# Patient Record
Sex: MALE | Race: ASIAN | NOT HISPANIC OR LATINO | Employment: FULL TIME | URBAN - METROPOLITAN AREA
[De-identification: names, ages, dates, MRNs, and addresses within clinical notes are randomized per-mention and may not be internally consistent; named-entity substitution may affect disease eponyms.]

---

## 2020-08-19 ENCOUNTER — TELEPHONE (OUTPATIENT)
Dept: FAMILY MEDICINE | Facility: CLINIC | Age: 45
End: 2020-08-19

## 2020-08-19 DIAGNOSIS — Z20.822 EXPOSURE TO 2019 NOVEL CORONAVIRUS: ICD-10-CM

## 2020-08-19 DIAGNOSIS — Z20.822 EXPOSURE TO 2019 NOVEL CORONAVIRUS: Primary | ICD-10-CM

## 2020-08-19 LAB
COVID-19 VIRUS PCR TO U OF MN - SOURCE: NORMAL
SARS-COV-2 RNA SPEC QL NAA+PROBE: NOT DETECTED

## 2020-08-19 NOTE — TELEPHONE ENCOUNTER
[facility] phone call message- general phone call:    Reason for call: Calling to schedule covid for patient, she states he is exposed to her as she test positive for covid. Patient was last seen 2016. Please call and advise.     Return call needed: Yes    OK to leave a message on voice mail?     Primary language: English      needed? No    Call taken on August 19, 2020 at 9:19 AM by Alvin Abreu

## 2020-08-19 NOTE — PATIENT INSTRUCTIONS
Instructions for Patients  Patient Education   After Your COVID-19 (Coronavirus) Test  You have been tested for COVID-19 (coronavirus).   If you'll have surgery in the next few days, we'll let you know ahead of time if you have the virus. Please call your surgeon's office with any questions.  For all other patients: Results are usually available within 2 to 10 days. Our testing sites do not have access to your test results.     If your test result is positive, you'll get a phone call letting you know. (A positive test means that you have the virus.)    If your test result is negative, you'll get a letter in the mail. (A negative test suggests you do not have the virus.) If you use [employer], you'll get a message from [employer] when your result is ready.  After 7 to 10 days, if you have not gotten your results:     Call [phone] and ask to speak with our COVID-19 results team.    If you're being treated at an infusion center: Call your infusion center directly.  What are the symptoms of COVID-19?  Symptoms may include any of the following: Fever, cough, trouble breathing, headache, body aches, sore throat, runny or stuffy nose, fatigue (feeling very tired), diarrhea (loose poop), and nausea or vomiting (feeling sick to the stomach or throwing up).  You may already have symptoms of COVID-19, or they may show up later.  What should I do if I have symptoms?  If you're having surgery: Call your surgeon's office.  For all other patients: Stay home and away from others (self-isolate) until ...    You've had no fever--and no medicine that reduces fever--for 1 full day (24 hours), AND    Other symptoms have gotten better. For example, your cough or breathing has improved, AND    At least 10 days have passed since your symptoms first started.  During this time    Stay in your own room, even for meals. Use your own bathroom if you can.    Stay away from others in your home. No hugging, kissing or shaking hands. No  "visitors.    Don't go to work, school or anywhere else.    Clean \"high touch\" surfaces often (doorknobs, counters, handles). Use household cleaning spray or wipes. You'll find a full list of  on the EPA website: www.epa.gov/pesticide-registration/list-n-disinfectants-use-against-sars-cov-2.    Cover your mouth and nose with a mask, tissue or washcloth to avoid spreading germs.    Wash your hands and face often. Use soap and water.    Caregivers in these groups are at risk for severe illness due to COVID-19:  ? People 65 years and older  ? People who live in a nursing home or long-term care facility  ? People with chronic disease (lung, heart, cancer, diabetes, kidney, liver, immunologic)  ? People who have a weakened immune system, including those who:    Are in cancer treatment    Take medicine that weakens the immune system, such as corticosteroids    Had a bone marrow or organ transplant    Have an immune deficiency    Have poorly controlled HIV or AIDS    Are obese (body mass index of 40 or higher)    Smoke regularly    Caregivers should wear gloves while washing dishes, handling laundry and cleaning bedrooms and bathrooms.    Use caution when washing and drying laundry: Don't shake dirty laundry and use the warmest water setting that you can.    For more tips on managing your health at home, go to www.cdc.gov/coronavirus/2019-ncov/downloads/10Things.pdf.  How can I take care of myself at home?  1. Get lots of rest. Drink extra fluids (unless a doctor has told you not to).  2. Take Tylenol (acetaminophen) for fever or pain. If you have liver or kidney problems, ask your family doctor if it's okay to take Tylenol.     Adults can take either:  ? 650 mg (two 325 mg pills) every 4 to 6 hours, or   ? 1,000 mg (two 500 mg pills) every 8 hours as needed.  ? Note: Don't take more than 3,000 mg in one day. Acetaminophen is found in many medicines (both prescribed and over-the-counter medicines). Read all labels " to be sure you don't take too much.   For children, check the Tylenol bottle for the right dose. The dose is based on the child's age or weight.  3. If you have other health problems (like cancer, heart failure, an organ transplant or severe kidney disease): Call your specialty clinic if you don't feel better in the next 2 days.  4. Know when to call 911. Emergency warning signs include:  ? Trouble breathing or shortness of breath  ? Chest pain or pressure that doesn't go away  ? Feeling confused like you haven't felt before, or not being able to wake up  ? Bluish-colored lips or face  5. If your doctor prescribed a blood thinner medicine: Follow their instructions.  Where can I get more information?    [facility] - About COVID-19:   www.[employer]/covid19    CDC - If You're Sick: cdc.gov/coronavirus/2019-ncov/about/steps-when-sick.html    CDC - Ending Home Isolation: www.cdc.gov/coronavirus/2019-ncov/hcp/disposition-in-home-patients.html    CDC - Caring for Someone: www.cdc.gov/coronavirus/2019-ncov/if-you-are-sick/care-for-someone.html    [facility] - Interim Guidance for Hospital Discharge to Home: www.health.[facility].[place]/diseases/coronavirus/hcp/hospdischarge.pdf    [facility] clinical trials (COVID-19 research studies): clinicalaffairs.[facility]-clinical-trials    Below are the COVID-19 hotlines at the [facility] of Health ([facility]). Interpreters are available.  ? For health questions: Call 145-915-3992 or 1-535.759.9063 (7 a.m. to 7 p.m.)  ? For questions about schools and childcare: Call 800-715-5986 or 1-387.219.3224 (7 a.m. to 7 p.m.)    For informational purposes only. Not to replace the advice of your health care provider. Clinically reviewed by Infection Prevention and the [facility] COVID-19 Clinical Team. Copyright   2020 [employer]. All rights reserved. [employer] 313817 - Rev 06/07/20.             Thank you for taking steps to prevent the spread of  this virus.  o Limit your contact with others.  o Wear a simple mask to cover your cough.  o Wash your hands well and often.  o If you need medical care, go to [facility] or contact your health care provider.     For more about COVID-19 and caring for yourself at home, visit the CDC website at https://www.cdc.gov/coronavirus/2019-ncov/about/steps-when-sick.html.     To learn about care at [facility], please go to [email or web address]/[email or web address]-19.     [facility] clinical trials (COVID-19 research studies): clinicalaffairs.[facility]-clinical-trials.    Below are the COVID-19 hotlines at the [facility] of Health ([facility]). Interpreters are available.     For health questions: Call 420-266-6120 or 1-204.619.5360 (7 a.m. to 7 p.m.)    For questions about schools and childcare: Call 110-120-4930 or 1-833.390.7195 (7 a.m. to 7 p.m.)

## 2020-08-21 ENCOUNTER — TELEPHONE (OUTPATIENT)
Dept: FAMILY MEDICINE | Facility: CLINIC | Age: 45
End: 2020-08-21

## 2020-08-21 NOTE — TELEPHONE ENCOUNTER
[facility] phone call message- patient requesting results:    Test: Lab    Date of test: 08/19/2020    Additional Comments: Patient wants to know what his covid test results are. Please call ad advise.     OK to leave a message on voice mail? Yes    Primary language: English      needed? No    Call taken on August 21, 2020 at 10:54 AM by Kary Cullen

## 2020-08-21 NOTE — TELEPHONE ENCOUNTER
Jose informed of negative COVID19 result, will print and place copy of result up front for . Patient to . Jose MOTT

## 2020-08-21 NOTE — TELEPHONE ENCOUNTER
Patient would like his covid results print, he needs it for his work and he will . Call when it is ready for . Please call and advise.

## 2021-06-09 ENCOUNTER — OFFICE VISIT (OUTPATIENT)
Dept: FAMILY MEDICINE | Facility: CLINIC | Age: 46
End: 2021-06-09
Payer: COMMERCIAL

## 2021-06-09 VITALS
HEIGHT: 62 IN | SYSTOLIC BLOOD PRESSURE: 133 MMHG | TEMPERATURE: 97.8 F | BODY MASS INDEX: 31.83 KG/M2 | WEIGHT: 173 LBS | DIASTOLIC BLOOD PRESSURE: 73 MMHG | HEART RATE: 71 BPM | OXYGEN SATURATION: 95 % | RESPIRATION RATE: 16 BRPM

## 2021-06-09 DIAGNOSIS — E66.811 CLASS 1 OBESITY DUE TO EXCESS CALORIES WITHOUT SERIOUS COMORBIDITY WITH BODY MASS INDEX (BMI) OF 31.0 TO 31.9 IN ADULT: ICD-10-CM

## 2021-06-09 DIAGNOSIS — Z87.892 HISTORY OF ANAPHYLAXIS: Primary | ICD-10-CM

## 2021-06-09 DIAGNOSIS — E66.09 CLASS 1 OBESITY DUE TO EXCESS CALORIES WITHOUT SERIOUS COMORBIDITY WITH BODY MASS INDEX (BMI) OF 31.0 TO 31.9 IN ADULT: ICD-10-CM

## 2021-06-09 LAB
CHOLEST SERPL-MCNC: 187 MG/DL
FASTING?: NO
HDLC SERPL-MCNC: 52 MG/DL
LDLC SERPL CALC-MCNC: 116 MG/DL
TRIGL SERPL-MCNC: 93 MG/DL

## 2021-06-09 PROCEDURE — 90471 IMMUNIZATION ADMIN: CPT | Performed by: STUDENT IN AN ORGANIZED HEALTH CARE EDUCATION/TRAINING PROGRAM

## 2021-06-09 PROCEDURE — 90715 TDAP VACCINE 7 YRS/> IM: CPT | Performed by: STUDENT IN AN ORGANIZED HEALTH CARE EDUCATION/TRAINING PROGRAM

## 2021-06-09 PROCEDURE — 99204 OFFICE O/P NEW MOD 45 MIN: CPT | Mod: 25 | Performed by: STUDENT IN AN ORGANIZED HEALTH CARE EDUCATION/TRAINING PROGRAM

## 2021-06-09 PROCEDURE — 36415 COLL VENOUS BLD VENIPUNCTURE: CPT | Performed by: STUDENT IN AN ORGANIZED HEALTH CARE EDUCATION/TRAINING PROGRAM

## 2021-06-09 RX ORDER — EPINEPHRINE 0.3 MG/.3ML
0.3 INJECTION SUBCUTANEOUS PRN
Qty: 2 EACH | Refills: 3 | Status: SHIPPED | OUTPATIENT
Start: 2021-06-09

## 2021-06-09 ASSESSMENT — MIFFLIN-ST. JEOR: SCORE: 1540.97

## 2021-06-09 NOTE — LETTER
Isabela 10, 2021      Jose SIFUENTES Brady  [address] [place] [address]        Dear ,    We are writing to inform you of your test results.    Your cholesterol looks great.  Keep up the good work.    Resulted Orders   Lipid [place] ([facility]) - Results > 1 hr   Result Value Ref Range    Cholesterol 187 <=199 mg/dL    Triglycerides 93 <=149 mg/dL    HDL Cholesterol 52 >=40 mg/dL    LDL Cholesterol Calculated 116 <=129 mg/dL    Fasting? No     Narrative    Test performed by:  [facility]  [address] [place] [address]       If you have any questions or concerns, please call the clinic at the number listed above.       Sincerely,      Stuart Lanier MD

## 2021-06-09 NOTE — PROGRESS NOTES
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  He has had reactions in the past that sound anaphylactic in nature.  Has never used EpiPen in the past, but his old one is .  Plan:   -EPINEPHrine (ANY BX GENERIC EQUIV) 0.3 MG/0.3ML injection 2-pack    (E66.09,  Z68.31) Class 1 obesity due to excess calories without serious comorbidity with body mass index (BMI) of 31.0 to 31.9 in adult  Comment: Patient has no morbid obesity.  Did discuss the implications of this long-term on his health.  Did discuss ways to intervene both with diet and exercise.  Discussed details and examples.  Patient was allowed to ask questions.  Plan:   -Lipid [place] ([facility]) - Results > 1 hr,   -Diet and exercise counseling    Tdap given as well      Options for treatment and follow-up care were reviewed with the patient and/or guardian. Jose Abreu and/or guardian engaged in the decision making process and verbalized understanding of the options discussed and agreed with the final plan.      Link Hatch MD    Precepted today with: Stuart Lanier MD           HPI:       Jose Abreu is a 45 year old  male with a significant past medical history of allergies accompanied with self who presents for the new concern(s) of    1) establishing care  -last time seen a doctor: over 1 year ago  -no PSH  -PMH of allergies, but no heart/kidney/liver/DM/cancer  -FH negative as well  - and works currently  -drinks alcohol on social events  -no tobacco or drugs    2) history of anaphylaxis  -horses, mangos = triggers  -has had anaphylactic reactions in the past  -need epipen    3)   -discussed weight issues  -Due for lipid screening, for which he agreed to  -Due for Tdap, for which he agreed to           Review of Systems:     4-point ROS reviewed and negative unless otherwise noted in HPI           Objective:     Vitals:    21  "1047   BP: 133/73   BP Location: Right arm   Pulse: 71   Resp: 16   Temp: 97.8  F (36.6  C)   TempSrc: Oral   SpO2: 95%   Weight: 78.5 kg (173 lb)   Height: 1.57 m (5' 1.81\")     Body mass index is 31.84 kg/m .    Exam: Detailed exams of pulmonary and cardiovascular systems, and brief exams consitutional, ocular, HENT, musculoskeletal, integumentary, neurological, and psychiatric systems were performed - pertinent findings include: None        External Data: None    Internal Data: Labs ordered  "

## 2021-06-10 NOTE — RESULT ENCOUNTER NOTE
"Please send the following in a letter to the patient:     \"Your cholesterol looks great.  Keep up the good work.\""

## 2021-06-11 PROBLEM — Z87.892 HISTORY OF ANAPHYLAXIS: Status: ACTIVE | Noted: 2021-06-11

## 2021-06-11 PROBLEM — E66.811 CLASS 1 OBESITY DUE TO EXCESS CALORIES WITHOUT SERIOUS COMORBIDITY WITH BODY MASS INDEX (BMI) OF 31.0 TO 31.9 IN ADULT: Status: ACTIVE | Noted: 2021-06-11

## 2021-06-11 PROBLEM — E66.09 CLASS 1 OBESITY DUE TO EXCESS CALORIES WITHOUT SERIOUS COMORBIDITY WITH BODY MASS INDEX (BMI) OF 31.0 TO 31.9 IN ADULT: Status: ACTIVE | Noted: 2021-06-11

## 2021-06-16 NOTE — PROGRESS NOTES
Preceptor Attestation:  Patient's case reviewed and discussed with Link Hatch MD resident and I evaluated the patient. I agree with written assessment and plan of care.  Supervising Physician:  Stuart Lanier MD, MD [facility]  [facility]

## 2021-11-14 ENCOUNTER — HOSPITAL ENCOUNTER (EMERGENCY)
Facility: HOSPITAL | Age: 46
Discharge: HOME | End: 2021-11-14
Attending: EMERGENCY MEDICINE | Admitting: EMERGENCY MEDICINE
Payer: COMMERCIAL

## 2021-11-14 VITALS
SYSTOLIC BLOOD PRESSURE: 134 MMHG | HEART RATE: 60 BPM | OXYGEN SATURATION: 99 % | HEIGHT: 62 IN | WEIGHT: 173.28 LBS | RESPIRATION RATE: 16 BRPM | DIASTOLIC BLOOD PRESSURE: 65 MMHG | TEMPERATURE: 98.2 F | BODY MASS INDEX: 31.89 KG/M2

## 2021-11-14 DIAGNOSIS — M54.12 CERVICAL RADICULOPATHY, ACUTE: ICD-10-CM

## 2021-11-14 PROCEDURE — 99284 EMERGENCY DEPT VISIT MOD MDM: CPT

## 2021-11-14 PROCEDURE — 250N000013 HC RX MED GY IP 250 OP 250 PS 637: Performed by: EMERGENCY MEDICINE

## 2021-11-14 RX ORDER — CYCLOBENZAPRINE HCL 10 MG
10 TABLET ORAL ONCE
Status: COMPLETED | OUTPATIENT
Start: 2021-11-14 | End: 2021-11-14

## 2021-11-14 RX ORDER — CYCLOBENZAPRINE HCL 10 MG
10 TABLET ORAL 2 TIMES DAILY PRN
Qty: 20 TABLET | Refills: 0 | Status: SHIPPED | OUTPATIENT
Start: 2021-11-14 | End: 2022-01-17

## 2021-11-14 RX ORDER — METHYLPREDNISOLONE 4 MG
TABLET, DOSE PACK ORAL
Qty: 21 TABLET | Refills: 0 | Status: SHIPPED | OUTPATIENT
Start: 2021-11-14 | End: 2022-01-17

## 2021-11-14 RX ORDER — ACETAMINOPHEN 325 MG/1
975 TABLET ORAL ONCE
Status: COMPLETED | OUTPATIENT
Start: 2021-11-14 | End: 2021-11-14

## 2021-11-14 RX ORDER — IBUPROFEN 600 MG/1
600 TABLET, FILM COATED ORAL ONCE
Status: COMPLETED | OUTPATIENT
Start: 2021-11-14 | End: 2021-11-14

## 2021-11-14 RX ADMIN — IBUPROFEN 600 MG: 600 TABLET, FILM COATED ORAL at 05:13

## 2021-11-14 RX ADMIN — CYCLOBENZAPRINE 10 MG: 10 TABLET, FILM COATED ORAL at 05:12

## 2021-11-14 RX ADMIN — ACETAMINOPHEN 975 MG: 325 TABLET ORAL at 05:12

## 2021-11-14 ASSESSMENT — ENCOUNTER SYMPTOMS
NECK STIFFNESS: 0
SHORTNESS OF BREATH: 0
CHEST TIGHTNESS: 0
NECK PAIN: 1
WEAKNESS: 0
CONFUSION: 0
NUMBNESS: 0

## 2021-11-14 ASSESSMENT — MIFFLIN-ST. JEOR: SCORE: 1545.25

## 2021-11-14 NOTE — ED PROVIDER NOTES
EMERGENCY DEPARTMENT ENCOUNTER      NAME: Jose Abreu  AGE: 45 year old male  YOB: 1975  MRN: [id number]  EVALUATION DATE & TIME: No admission date for patient encounter.    PCP: Nj Iverson        Chief Complaint   Patient presents with     Shoulder Pain         FINAL IMPRESSION:  1. Cervical radiculopathy, acute          ED COURSE & MEDICAL DECISION MAKING:    Pertinent Labs & Imaging studies reviewed. (See chart for details)  45 year old male presents to the Emergency Department for evaluation of nontraumatic right neck, upper shoulder, upper arm pain aggravated with certain neck movements.  No weakness.  No chest pain.  Feeling better after his wife massaged it.    Most likely cervical radiculopathy and likely C5 radiculopathy.  No weakness on examination therefore emergent MRI not indicated.  ACS, DVT, rotator cuff tear, your dissection unlikely.  No red flags to indicate need for emergent imaging.       Plan for ibuprofen, Tylenol, Cymetra Dosepak, Flexeril, follow-up with spine center.         4:09AM I met with the patient for the initial interview and physical examination. Discussed plan for treatment and workup in the ED. PPE: Provider wore gloves, face shield, and N95 mask.  4:19 AM I discussed the plan for discharge with the patient, and patient is agreeable. We discussed supportive cares at home and reasons for return to the ER including new or worsening symptoms - all questions and concerns addressed. Patient to be discharged by RN.    At the conclusion of the encounter I discussed the results of all of the tests and the disposition. The questions were answered. The patient or family acknowledged understanding and was agreeable with the care plan.         MEDICATIONS GIVEN IN THE EMERGENCY:  Medications   cyclobenzaprine (FLEXERIL) tablet 10 mg (has no administration in time range)   ibuprofen (ADVIL/MOTRIN) tablet 600 mg (has no administration in time range)   acetaminophen  "(TYLENOL) tablet 975 mg (has no administration in time range)       NEW PRESCRIPTIONS STARTED AT TODAY'S ER VISIT  New Prescriptions    CYCLOBENZAPRINE (FLEXERIL) 10 MG TABLET    Take 1 tablet (10 mg) by mouth 2 times daily as needed for muscle spasms    METHYLPREDNISOLONE (MEDROL DOSEPAK) 4 MG TABLET THERAPY PACK    Follow Package Directions            =================================================================    HPI    Patient information was obtained from: patient    Use of Intrepreter: N/A         Jose Abreu is a 45 year old male with no pertinent history who presents to this ED as a walk in with wife for evaluation of shoulder pain.     Patient reports that he woke up with right shoulder pain a few days ago and pain has since improved, but persists. Initially pain was located in the posterior right shoulder and gradually radiated into his proximal arm. However, it has since migrated and is now localized to the anterior shoulder and proximal arm. Palliating factors include massaging the area. Provoking factors include certain neck movements, where he can feel a \"pulling\" sensation radiating from his neck into the shoulder. No known injury or trauma. Has been taking ASA at home without relief. Presented to the ED tonight because he was unable to tolerate the pain. Adds that he uses his arm a lot at work but does not do a lot of heavy lifting. Denies associated weakness, numbness, or rash. Otherwise denies left arm pain, chest pain, shortness of breath, or any other complaints at this time. He is right hand dominant. Non-smoker.      REVIEW OF SYSTEMS   Review of Systems   Respiratory: Negative for chest tightness and shortness of breath.    Cardiovascular: Negative for chest pain.   Musculoskeletal: Positive for neck pain. Negative for neck stiffness.        Positive for right shoulder pain. Negative for left shoulder pain.   Skin: Negative for rash.   Neurological: Negative for weakness and numbness. "   Psychiatric/Behavioral: Negative for confusion.          PAST MEDICAL HISTORY:  Past Medical History:   Diagnosis Date     ACL (anterior cruciate ligament) tear 1996    Bilateral - volleyball     NO ACTIVE PROBLEMS        PAST SURGICAL HISTORY:  Past Surgical History:   Procedure Laterality Date     HERNIA REPAIR Right 2008    [facility]           CURRENT MEDICATIONS:    Patient's Medications   New Prescriptions    CYCLOBENZAPRINE (FLEXERIL) 10 MG TABLET    Take 1 tablet (10 mg) by mouth 2 times daily as needed for muscle spasms    METHYLPREDNISOLONE (MEDROL DOSEPAK) 4 MG TABLET THERAPY PACK    Follow Package Directions   Previous Medications    ALBUTEROL (PROAIR HFA, PROVENTIL HFA, VENTOLIN HFA) 108 (90 BASE) MCG/ACT INHALER    Inhale 2 puffs into the lungs every 6 hours as needed    EPINEPHRINE (ANY BX GENERIC EQUIV) 0.3 MG/0.3ML INJECTION 2-PACK    Inject 0.3 mLs (0.3 mg) into the muscle as needed for anaphylaxis Call EMS after use.    FLUTICASONE (FLOVENT HFA) 44 MCG/ACT INHALER    Inhale 2 puffs into the lungs 2 times daily    LORATADINE (CLARITIN) 10 MG TABLET    Take 1 tablet (10 mg) by mouth daily   Modified Medications    No medications on file   Discontinued Medications    No medications on file       ALLERGIES:  Allergies   Allergen Reactions     Horse Allergy Itching     Eland Butter Hives     Nkda [No Known Drug Allergies]        FAMILY HISTORY:  Family History   Problem Relation Age of Onset     Diabetes No family hx of      Coronary Artery Disease No family hx of      Cerebrovascular Disease No family hx of      Hypertension No family hx of      Hyperlipidemia No family hx of      Breast Cancer No family hx of      Colon Cancer No family hx of      Prostate Cancer No family hx of      Other Cancer No family hx of      Depression No family hx of      Asthma No family hx of      Anxiety Disorder No family hx of      Mental Illness No family hx of      Substance Abuse No family hx of      Anesthesia Reaction  "No family hx of      Osteoporosis No family hx of      Genetic Disorder No family hx of      Thyroid Disease No family hx of      Obesity No family hx of        SOCIAL HISTORY:   Social History     Socioeconomic History     Marital status:      Spouse name: Not on file     Number of children: Not on file     Years of education: Not on file     Highest education level: Not on file   Occupational History     Not on file   Tobacco Use     Smoking status: Never Smoker     Smokeless tobacco: Never Used   Substance and Sexual Activity     Alcohol use: No     Drug use: No     Sexual activity: Yes     Partners: Female   Other Topics Concern     Parent/sibling w/ CABG, MI or angioplasty before 65F 55M? Not Asked   Social History Narrative     Not on file     Social Determinants of Health     Financial Resource Strain: Not on file   Food Insecurity: Not on file   Transportation Needs: Not on file   Physical Activity: Not on file   Stress: Not on file   Social Connections: Not on file   Intimate Partner Violence: Not on file   Housing Stability: Not on file       VITALS:  Patient Vitals for the past 24 hrs:   BP Temp Temp src Pulse Resp SpO2 Height Weight   11/14/21 0306 125/67 98  F (36.7  C) Oral 62 16 98 % 1.575 m (5' 2\") 78.6 kg (173 lb 4.5 oz)       PHYSICAL EXAM     Vitals: /67   Pulse 62   Temp 98  F (36.7  C) (Oral)   Resp 16   Ht 1.575 m (5' 2\")   Wt 78.6 kg (173 lb 4.5 oz)   SpO2 98%   BMI 31.69 kg/m    General: Appears in no acute distress, awake, alert, interactive.  Eyes: Conjunctivae non-injected. Sclera anicteric.  HENT: Atraumatic.  Neck: Supple.  Respiratory/Chest: Respiration unlabored.  Heart: 2+ radial pulses  Abdomen: non distended  Musculoskeletal: Normal extremities. No edema or erythema.  Full range of motion of upper extremities.  5-5 strength with elbow flexion/ extension, shoulder abduction, forward flexion shoulder,  strength, finger and thumb strength and wrist strength.  " Patient has some tenderness to his right upper trapezius muscle.  Positive Spurling's test to right.  No clavicle tenderness.  No tenderness to his deltoid.  Skin: Normal color. No rash or diaphoresis.  Neurologic: Face symmetric, moves all extremities spontaneously.  Symmetric light touch sensation upper extremities.  Symmetric biceps reflexes  Psychiatric: Oriented to person, place, and time. Affect appropriate.        I, Linda Peres, am serving as a scribe to document services personally performed by Dr. Jaffe based on my observation and the provider's statements to me. I, Dylan Jaffe MD attest that Linda Peres is acting in a scribe capacity, has observed my performance of the services and has documented them in accordance with my direction.    Dylan Jaffe M.D.  Emergency Medicine  [facility] EMERGENCY DEPARTMENT  [facility] [facility] [address]  [phone]  Dept: [phone]     Giovanny Jaffe MD  11/14/21 0432

## 2021-11-14 NOTE — Clinical Note
Jose Abreu was seen and treated in our emergency department on 11/14/2021.  He may return to work on 11/15/2021.  No lifting greater than 10 lbs.      If you have any questions or concerns, please don't hesitate to call.      Giovanny Jaffe MD

## 2021-11-14 NOTE — ED TRIAGE NOTES
"Pt complains of right neck and shoulder pain, unsure of any injury. Pain started  2 days ago. Pt states some \"numb feeling come and go\" in right lower arm. Pt states \"pain better in neck after my wife massaged it\".  "

## 2021-11-14 NOTE — DISCHARGE INSTRUCTIONS
Recommend Tylenol every 4-6 hours.  Recommend ibuprofen every 6-8 hours.  Recommend Flexeril twice a day as needed for spasming.  Recommend daily Solu-Medrol.  Follow-up with [facility].  Return the ER for weakness, fever, worsening pain  
EMT/paramedic

## 2021-11-17 ENCOUNTER — OFFICE VISIT (OUTPATIENT)
Dept: FAMILY MEDICINE | Facility: CLINIC | Age: 46
End: 2021-11-17
Payer: COMMERCIAL

## 2021-11-17 VITALS
SYSTOLIC BLOOD PRESSURE: 137 MMHG | HEART RATE: 69 BPM | WEIGHT: 171 LBS | BODY MASS INDEX: 31.28 KG/M2 | RESPIRATION RATE: 16 BRPM | DIASTOLIC BLOOD PRESSURE: 85 MMHG | OXYGEN SATURATION: 98 % | TEMPERATURE: 97.1 F

## 2021-11-17 DIAGNOSIS — M54.12 CERVICAL RADICULOPATHY: Primary | ICD-10-CM

## 2021-11-17 DIAGNOSIS — M25.511 ACUTE PAIN OF RIGHT SHOULDER: ICD-10-CM

## 2021-11-17 DIAGNOSIS — R20.2 PARESTHESIA OF RIGHT ARM: ICD-10-CM

## 2021-11-17 PROCEDURE — 99214 OFFICE O/P EST MOD 30 MIN: CPT | Performed by: FAMILY MEDICINE

## 2021-11-17 RX ORDER — TRAMADOL HYDROCHLORIDE 50 MG/1
50 TABLET ORAL EVERY 6 HOURS PRN
Qty: 30 TABLET | Refills: 0 | Status: SHIPPED | OUTPATIENT
Start: 2021-11-17 | End: 2021-11-20

## 2021-11-17 ASSESSMENT — ASTHMA QUESTIONNAIRES
ACT_TOTALSCORE: 24
QUESTION_5 LAST FOUR WEEKS HOW WOULD YOU RATE YOUR ASTHMA CONTROL: WELL CONTROLLED
QUESTION_1 LAST FOUR WEEKS HOW MUCH OF THE TIME DID YOUR ASTHMA KEEP YOU FROM GETTING AS MUCH DONE AT WORK, SCHOOL OR AT HOME: NONE OF THE TIME
QUESTION_3 LAST FOUR WEEKS HOW OFTEN DID YOUR ASTHMA SYMPTOMS (WHEEZING, COUGHING, SHORTNESS OF BREATH, CHEST TIGHTNESS OR PAIN) WAKE YOU UP AT NIGHT OR EARLIER THAN USUAL IN THE MORNING: NOT AT ALL
QUESTION_4 LAST FOUR WEEKS HOW OFTEN HAVE YOU USED YOUR RESCUE INHALER OR NEBULIZER MEDICATION (SUCH AS ALBUTEROL): NOT AT ALL
QUESTION_2 LAST FOUR WEEKS HOW OFTEN HAVE YOU HAD SHORTNESS OF BREATH: NOT AT ALL

## 2021-11-17 NOTE — LETTER
November 17, 2021      Jose Abreu  [address] [place] [address]        To Whom It May Concern:    Jose Abreu  was seen on 11/17/21.  Please excuse him until 11/22/21 due to injury.        Sincerely,        Laura Vargas MD

## 2021-11-18 ASSESSMENT — ASTHMA QUESTIONNAIRES: ACT_TOTALSCORE: 24

## 2021-11-18 ASSESSMENT — PATIENT HEALTH QUESTIONNAIRE - PHQ9: SUM OF ALL RESPONSES TO PHQ QUESTIONS 1-9: 0

## 2021-11-23 ENCOUNTER — HOSPITAL ENCOUNTER (OUTPATIENT)
Dept: MRI IMAGING | Facility: CLINIC | Age: 46
Discharge: HOME | End: 2021-11-23
Attending: FAMILY MEDICINE | Admitting: FAMILY MEDICINE
Payer: COMMERCIAL

## 2021-11-23 DIAGNOSIS — R20.2 PARESTHESIA OF RIGHT ARM: ICD-10-CM

## 2021-11-23 DIAGNOSIS — M54.12 CERVICAL RADICULOPATHY: ICD-10-CM

## 2021-11-23 DIAGNOSIS — M25.511 ACUTE PAIN OF RIGHT SHOULDER: ICD-10-CM

## 2021-11-23 PROCEDURE — 72141 MRI NECK SPINE W/O DYE: CPT

## 2021-11-26 NOTE — PROGRESS NOTES
Assessment/plan  1. Cervical radiculopathy  - traMADol (ULTRAM) 50 MG tablet; Take 1 tablet (50 mg) by mouth every 6 hours as needed for moderate to severe pain  Dispense: 30 tablet; Refill: 0  - MR Cervical Spine w/o Contrast; Future  2. Paresthesia of right arm  - traMADol (ULTRAM) 50 MG tablet; Take 1 tablet (50 mg) by mouth every 6 hours as needed for moderate to severe pain  Dispense: 30 tablet; Refill: 0  - MR Cervical Spine w/o Contrast; Future  3. Acute pain of right shoulder  - MR Cervical Spine w/o Contrast; Future  EHR reviewed.   Past medical history, problem list, past surgical history, family history, social history, medications reviewed, updated, reconciled.   We discussed possible etiology such as cervical radiculopathy, rotator cuff tendinitis, nerve impingement. There are no signs of rotator cuff tear.   Due to exam and ongoing concerns, MRI scheduled to evaluation cspine.   We reviewed supportive care, consideration of PT, ice and heat application, limiting activities that exacerbate his symptoms, rest as needed, trial of tramadol.   Will follow up once imaging is reviewed.         Subjective  [age] year old male here for follow up.   Was seen in the ED five days ago.   He has ongoing and worsening right arm pain. This started about two to three weeks ago. He is concerned because the pain is worse now. His work involves a lot of arm work, he is constantly lifting, reaching over his head, pulling levers down. He has much tingling in her arm, from the biceps to his hand. Massage is the only thing that helps. He used the medication from the ER with little relief. No fall or injury. Has been at his job for a long time. No other joint concerns or problems.     ROS: 12 systems reviewed, all negative exceptfor what is mentioned in HPI.   Past Medical History:   Diagnosis Date     ACL (anterior cruciate ligament) tear 1996    Bilateral - volleyball     NO ACTIVE PROBLEMS      Patient Active Problem  List   Diagnosis     Allergic rhinitis due to animals     Cough variant asthma     Class 1 obesity due to excess calories without serious comorbidity with body mass index (BMI) of 31.0 to 31.9 in adult     History of anaphylaxis     Past Surgical History:   Procedure Laterality Date     HERNIA REPAIR Right 2008    [facility]     Family History   Problem Relation Age of Onset     Diabetes No family hx of      Coronary Artery Disease No family hx of      Cerebrovascular Disease No family hx of      Hypertension No family hx of      Hyperlipidemia No family hx of      Breast Cancer No family hx of      Colon Cancer No family hx of      Prostate Cancer No family hx of      Other Cancer No family hx of      Depression No family hx of      Asthma No family hx of      Anxiety Disorder No family hx of      Mental Illness No family hx of      Substance Abuse No family hx of      Anesthesia Reaction No family hx of      Osteoporosis No family hx of      Genetic Disorder No family hx of      Thyroid Disease No family hx of      Obesity No family hx of      Social History     Socioeconomic History     Marital status:      Spouse name: Not on file     Number of children: Not on file     Years of education: Not on file     Highest education level: Not on file   Occupational History     Not on file   Tobacco Use     Smoking status: Never Smoker     Smokeless tobacco: Never Used   Substance and Sexual Activity     Alcohol use: No     Drug use: No     Sexual activity: Yes     Partners: Female   Other Topics Concern     Parent/sibling w/ CABG, MI or angioplasty before 65F 55M? Not Asked   Social History Narrative     Not on file     Social Determinants of Health     Financial Resource Strain: Not on file   Food Insecurity: Not on file   Transportation Needs: Not on file   Physical Activity: Not on file   Stress: Not on file   Social Connections: Not on file   Intimate Partner Violence: Not on file   Housing Stability: Not on file      Current Outpatient Medications   Medication     albuterol (PROAIR HFA, PROVENTIL HFA, VENTOLIN HFA) 108 (90 BASE) MCG/ACT inhaler     cyclobenzaprine (FLEXERIL) 10 MG tablet     EPINEPHrine (ANY BX GENERIC EQUIV) 0.3 MG/0.3ML injection 2-pack     fluticasone (FLOVENT HFA) 44 MCG/ACT inhaler     loratadine (CLARITIN) 10 MG tablet     methylPREDNISolone (MEDROL DOSEPAK) 4 MG tablet therapy pack     No current facility-administered medications for this visit.     Objective  /85 (BP Location: Right arm, Patient Position: Sitting, Cuff Size: Adult Regular)   Pulse 69   Temp 97.1  F (36.2  C) (Temporal)   Resp 16   Wt 77.6 kg (171 lb)   SpO2 98%   BMI 31.28 kg/m      General Appearance:  Alert, cooperative, no distress, appears stated age   Head:  Normocephalic, without obvious abnormality, atraumatic   Eyes:  PERRL, conjunctiva/corneas clear, EOM's intact   Throat: Lips, mucosa, and tongue normal   Neck: Supple   Lungs:   Clear to auscultation bilaterally, respirations unlabored   Heart:  Regular rate and rhythm, S1 and S2 normal, no murmur   Extremities: Right shoulder without any edema or effusion, elbow appears normal, no signs of rotator cuff tear, ?positive Roberts, negative neer   Skin: Skin color, texture, turgor normal, no rashes or lesions   Lymph nodes: Cervical, supraclavicular nodes normal   Neurologic: Normal, CN 2-12 intact, normal strength and tone

## 2021-11-28 ENCOUNTER — HEALTH MAINTENANCE LETTER (OUTPATIENT)
Age: 46
End: 2021-11-28

## 2022-01-17 ENCOUNTER — OFFICE VISIT (OUTPATIENT)
Dept: FAMILY MEDICINE | Facility: CLINIC | Age: 47
End: 2022-01-17
Payer: COMMERCIAL

## 2022-01-17 VITALS
HEART RATE: 89 BPM | WEIGHT: 175 LBS | RESPIRATION RATE: 16 BRPM | BODY MASS INDEX: 34.36 KG/M2 | HEIGHT: 60 IN | DIASTOLIC BLOOD PRESSURE: 92 MMHG | SYSTOLIC BLOOD PRESSURE: 146 MMHG | OXYGEN SATURATION: 98 % | TEMPERATURE: 97.7 F

## 2022-01-17 DIAGNOSIS — M54.12 CERVICAL RADICULOPATHY: Primary | ICD-10-CM

## 2022-01-17 PROCEDURE — 99214 OFFICE O/P EST MOD 30 MIN: CPT | Performed by: FAMILY MEDICINE

## 2022-01-17 RX ORDER — METHYLPREDNISOLONE 4 MG
TABLET, DOSE PACK ORAL
Qty: 21 TABLET | Refills: 0 | Status: SHIPPED | OUTPATIENT
Start: 2022-01-17 | End: 2022-01-31

## 2022-01-17 ASSESSMENT — MIFFLIN-ST. JEOR: SCORE: 1504.42

## 2022-01-17 NOTE — LETTER
Current Work Restrictions.    Re:Jose Abreu  1975    Date of Injury: N/A  Employer: [employer]      Diagnosis: Cervical radiculopathy     Work Related? No      When the patient returns to work, the following restrictions apply until 01/24/22:    No use of right arm for 7 days, then return to full duty    Fitness for Duty:      Maximum Medical Improvement has not been reached.    Follow-Up:Follow up in 2 weeks.    Bob íDaz MD

## 2022-01-17 NOTE — PROGRESS NOTES
HPI:   Jose Abreu is a 46 year old  male who presents for:    Chief Complaint   Patient presents with     Pain     right arm. Couple months ago and getting worse, burning pain. Started upper neck and travels down to the arm. Neck pain gone, pain in arm only.      Medication Reconciliation     needs attention       46-year-old male presents with right shoulder and upper arm pain.  First onset of the symptoms was in November 2021.  No recalled injury or falls.  He had the insidious onset of pain back in mid November.  He was evaluated on 11/14/2021 at the [facility] emergency department due to right shoulder and upper arm pain that was exacerbated with turning his neck to the side.  The emergency department visit was reviewed.  He was treated with a Medrol Dosepak, and underwent an MRI on 11/23/2021.  His MRI showed an eccentric right paracentral and foraminal disc osteophyte complex, and moderate right neural foraminal stenosis.  His pain had significantly improved by late November to the point that he declined follow-up visit offer by our care coordinator who called for a follow-up visit on 11/26/2021.  The same pain returned shortly thereafter, pain at the base of the right neck radiating down into the right shoulder and upper arm.  Worse when he turns his head fully to the right, or has to reach with his right arm.  He does a lot of reaching at work, although he reports no significant heavy lifting.    For the past 6 weeks he has had pain in the base of his right neck radiating to the right shoulder and arm.  Burning and aching pain.  Severity waxes and wanes.  Bothersome at night when he tries to rest.  Bothersome with reaching at work.  Over-the-counter pain medication has not been helpful.  Turning his neck to the side makes the pain worse.    No weakness.  No paresthesias.    Social history: Non-smoker.  Does not use drugs or alcohol.  Works full-time.           PMHX:     Patient Active Problem List  "  Diagnosis     Allergic rhinitis due to animals     Cough variant asthma     Class 1 obesity due to excess calories without serious comorbidity with body mass index (BMI) of 31.0 to 31.9 in adult     History of anaphylaxis       Current Outpatient Medications   Medication Sig Dispense Refill     EPINEPHrine (ANY BX GENERIC EQUIV) 0.3 MG/0.3ML injection 2-pack Inject 0.3 mLs (0.3 mg) into the muscle as needed for anaphylaxis Call EMS after use. 2 each 3     albuterol (PROAIR HFA, PROVENTIL HFA, VENTOLIN HFA) 108 (90 BASE) MCG/ACT inhaler Inhale 2 puffs into the lungs every 6 hours as needed 3 Inhaler 0     loratadine (CLARITIN) 10 MG tablet Take 1 tablet (10 mg) by mouth daily 30 tablet 3       Social History     Tobacco Use     Smoking status: Never Smoker     Smokeless tobacco: Never Used   Substance Use Topics     Alcohol use: No     Drug use: No       Social History     Social History Narrative     Not on file       Allergies   Allergen Reactions     Horse Allergy Itching     Radhames Butter Hives     Nkda [No Known Drug Allergies]             Review of Systems:     General: No fevers or chills  ENT: No upper respiratory symptoms.  Covid screening questions are negative.  No headaches.  Respiratory: No cough or shortness of breath  Cardiovascular: No chest pain  GI: No stool changes.  No bowel or bladder dysfunction  Skin: No skin breaks or rash           Physical Exam:     Vitals:    01/17/22 1410 01/17/22 1417   BP: (!) 154/95 (!) 146/92   BP Location: Right arm Right arm   Cuff Size: Adult Regular    Pulse: 89 89   Resp: 16    Temp: 97.7  F (36.5  C) 97.7  F (36.5  C)   TempSrc: Oral    SpO2: 98% 98%   Weight: 79.4 kg (175 lb)    Height: 1.505 m (4' 11.25\")      Body mass index is 35.05 kg/m .    General: Alert, in no acute distress  HEENT: Head is free of trauma.   Sclerae non-icteric. PERRL, Moist oral mucus membranes, no tonsilar hypertrophy or exudate. TM's white with normal bony and light landmarks.  Neck: " No midline cervical tenderness.  Muscle tightness along the right sternocleidomastoid.  Discomfort reported in his shoulder when he turns his head fully to the right.  Able to flex and extend the neck with slightly reduced range of motion.  Resp: Clear to auscultation bilaterally  CV: Regular rate and rhythm  Abd: Soft, non-tender.  Ext: Warm, no peripheral edema  Neurologic: 5 out of 5 strength in both upper extremities  Normal muscle bulk and tone in both upper extremities  Full active and passive range of motion at the right shoulder.  Reports discomfort with flexion of the right arm above head.  No point tenderness along the clavicle, AC, acromion, insertion of the biceps.    Normal strength to bilateral hip extension, hip flexion, knee extension, knee flexion, ankle dorsiflexion, ankle plantarflexion.  2+ bilateral patellar and ankle jerk reflexes    Skin: exposed skin free of rash  Psych: Mood appropriate       Outside records reviewed: [facility] emergency department visit dated 11/14/2021  MRI report dated 11/23/2021    Assessment and Plan   1. Cervical radiculopathy  Symptoms consistent with cervical radiculopathy, MRI findings from 11/23/2021 consistent with moderate right foraminal stenosis    Repeat Medrol Dosepak which seemed effective in November  Tylenol 1000 mg 3 times daily over the next 1 week  Work restrictions letter given to limit his activity over the next 1 week, letter written and given to the patient  He was given written instructions on indications for immediate reevaluation  Anticipate improvement over the next 2 weeks  Return for reevaluation in 2 weeks    Anticipate physical therapy as a long-term treatment strategy    Follow up: 2 weeks  Options for treatment and follow-up care were reviewed with the patient and/or guardian. Jose Abreu and/or guardian engaged in the decision making process and verbalized understanding of the options discussed and agreed with the final plan.    Bob  Arjun DE DIOS  Faculty - [facility]

## 2022-01-17 NOTE — PATIENT INSTRUCTIONS
Patient Education     Understanding Cervical Radiculopathy    Cervical radiculopathy is irritation or inflammation of a nerve root in the neck. It causes neck pain and other symptoms that may spread into the chest or down the arm. To understand this condition, it helps to understand the parts of the spine:    Vertebrae. These are bones that stack to form the spine. The cervical spine contains the 7 vertebrae in the neck.    Disks. These are soft pads of tissue between the vertebrae. They act as shock absorbers for the spine.    The spinal canal. This is a tunnel formed within the stacked vertebrae. The spinal cord runs through this canal.    Nerves. These branch off the spinal cord. As they leave the spinal canal, nerves pass through openings between the vertebrae. The nerve root is the part of the nerve that is closest to the spinal cord.   With cervical radiculopathy, nerve roots in the neck become irritated. This leads to pain and symptoms that can travel to the nerves that go from the spinal cord down the arms and into the torso.  What causes cervical radiculopathy?  Aging, injury, poor posture, and other issues can lead to problems in the neck. These problems may then irritate nerve roots. These include:    Damage to a disk in the cervical spine. The damaged disk may then press on nearby nerve roots.    Degeneration from wear and tear, and aging. This can lead to narrowing (stenosis) of the openings between the vertebrae. The narrowed openings press on nerve roots as they leave the spinal canal.    An unstable spine. This is when a vertebra slips forward. It can then press on a nerve root.  There are other, less common causes of pressure on nerves in the neck. These include infection, cysts, and tumors.  Symptoms of cervical radiculopathy  These include:    Neck pain    Pain, numbness, tingling, or weakness that travels down the arm    Loss of neck movement    Muscle spasms  Treatment for cervical  radiculopathy  In most cases, your healthcare provider will first try treatments that help relieve symptoms. These may include:    Prescription or over-the-counter pain medicines. These help relieve pain and swelling.    Cold packs. These help reduce pain.    Resting. This involves avoiding positions and activities that increase pain.    Neck brace (cervical collar). This can help relieve inflammation and pain.    Physical therapy, including exercises and stretches. This can help decrease pain and increase movement and function.    Shots of medicinesaround the nerve roots. This is done to help relieve symptoms for a time.  In some cases, your healthcare provider may advise surgery to fix the underlying problem. This depends on the cause, the symptoms, and how long the pain has lasted.  Possible complications  Over time, an irritated and inflamed nerve may become damaged. This may lead to long-lasting (permanent) numbness or weakness. If symptoms change suddenly or get worse, be sure to let your healthcare provider know.     When to call your healthcare provider  Call your healthcare provider right away if you have any of these:    New pain or pain that gets worse    New or increasing weakness, numbness, or tingling in your arm or hand    Bowel or bladder changes   [employer] last reviewed this educational content on 3/10/2016    7859-4369 [employer]. All rights reserved. This information is not intended as a substitute for professional medical care. Always follow your healthcare professional's instructions.    Take the Medtrol dose betzy as prescribed  Take 1000mg of tylenol 3 times daily for the next 7 days, then as needed  Work restrictions for one week  See Dr. Díaz in 2 weeks

## 2022-01-31 ENCOUNTER — OFFICE VISIT (OUTPATIENT)
Dept: FAMILY MEDICINE | Facility: CLINIC | Age: 47
End: 2022-01-31
Payer: COMMERCIAL

## 2022-01-31 ENCOUNTER — HOSPITAL ENCOUNTER (OUTPATIENT)
Facility: AMBULATORY SURGERY CENTER | Age: 47
End: 2022-01-31
Attending: FAMILY MEDICINE
Payer: COMMERCIAL

## 2022-01-31 VITALS
OXYGEN SATURATION: 99 % | HEART RATE: 68 BPM | DIASTOLIC BLOOD PRESSURE: 86 MMHG | WEIGHT: 170 LBS | SYSTOLIC BLOOD PRESSURE: 124 MMHG | RESPIRATION RATE: 20 BRPM | TEMPERATURE: 95.5 F | HEIGHT: 63 IN | BODY MASS INDEX: 30.12 KG/M2

## 2022-01-31 DIAGNOSIS — M54.12 CERVICAL RADICULOPATHY: Primary | ICD-10-CM

## 2022-01-31 DIAGNOSIS — Z11.59 ENCOUNTER FOR SCREENING FOR OTHER VIRAL DISEASES: Primary | ICD-10-CM

## 2022-01-31 DIAGNOSIS — Z12.11 SPECIAL SCREENING FOR MALIGNANT NEOPLASMS, COLON: ICD-10-CM

## 2022-01-31 DIAGNOSIS — S16.1XXD STRAIN OF NECK MUSCLE, SUBSEQUENT ENCOUNTER: ICD-10-CM

## 2022-01-31 PROCEDURE — 99213 OFFICE O/P EST LOW 20 MIN: CPT | Performed by: FAMILY MEDICINE

## 2022-01-31 ASSESSMENT — MIFFLIN-ST. JEOR: SCORE: 1541.73

## 2022-01-31 NOTE — PATIENT INSTRUCTIONS
I am glad to hear your neck pain has resolved.    Continue to care for your neck with simple stretches and daily waling exercise.    You will be contacted to set up your screening colonoscopy.  Call our clinic if you have not been contacted within one week    Patient Education     Shoulder, Upper Back, and Arm Exercise: Overhead Arm Stretch   To start, stand tall with your ears, shoulders, and hips in line. Your feet should be slightly apart, positioned just under your hips. Focus your eyes directly in front of you. Stay in this position for a few seconds before starting the exercise. This helps increase your awareness of proper posture. You can also do this exercise sitting up in a sturdy chair. Before doing this exercise, talk with your healthcare provider to make sure it's safe for you to do.        Reach overhead and slightly back with both arms. Keep your shoulders and neck aligned and your elbows behind your shoulders:     With your palms facing the ceiling, turn your fingers inward. You can also do this exercise holding weights if directed by your healthcare provider or physical therapist.    Take a deep breath. Breathe out and lower your elbows toward your buttocks. Hold for up to 5 seconds, then return to starting position.    Repeat 3 times, or as directed by your healthcare provider or physical therapist.  [employer] last reviewed this educational content on 7/1/2020 2000-2021 [employer]. All rights reserved. This information is not intended as a substitute for professional medical care. Always follow your healthcare professional's instructions.

## 2022-01-31 NOTE — PROGRESS NOTES
HPI:   Jose Abreu is a 46 year old  male who presents for:    Chief Complaint   Patient presents with     RECHECK     right arm, burning from inside out out     Medication Reconciliation     complete       Neck and right shoulder pain: I last saw the patient 2 weeks ago for right sided neck shoulder and upper arm pain.  To briefly review he had the initial onset of symptoms in mid November, prompting an emergency department visit on 11/14/2021.  No history of trauma or known injury.  Treated with a Medrol Dosepak.  He had an MRI on 11/23/2021 showing eccentric right paracentral and foraminal disc osteophyte complex, moderate right foraminal foraminal stenosis.  Had relief of pain for short while, until return of symptoms in early December.  He had progressive burning, aching pain at the base of the right neck radiating to the right shoulder and right upper arm, prompting reevaluation 2 weeks ago.  I treated with a Medrol Dosepak, Tylenol, and work restrictions.  In addition he underwent acupuncture every other day for 6 sessions starting the day after our visit 2 weeks ago.    His symptoms have resolved.  He attributes most of the improvement to the acupuncture.  He is back to full duty at work without difficulty or symptoms at work.  He reports that work has been light over the past week.    I also discussed recommendation for colon cancer screening.  He is willing to undergo colonoscopy for screening.         PMHX:     Patient Active Problem List   Diagnosis     Allergic rhinitis due to animals     Cough variant asthma     Class 1 obesity due to excess calories without serious comorbidity with body mass index (BMI) of 31.0 to 31.9 in adult     History of anaphylaxis       Current Outpatient Medications   Medication Sig Dispense Refill     albuterol (PROAIR HFA, PROVENTIL HFA, VENTOLIN HFA) 108 (90 BASE) MCG/ACT inhaler Inhale 2 puffs into the lungs every 6 hours as needed 3 Inhaler 0     EPINEPHrine (ANY  "BX GENERIC EQUIV) 0.3 MG/0.3ML injection 2-pack Inject 0.3 mLs (0.3 mg) into the muscle as needed for anaphylaxis Call EMS after use. 2 each 3     loratadine (CLARITIN) 10 MG tablet Take 1 tablet (10 mg) by mouth daily 30 tablet 3       Social History     Tobacco Use     Smoking status: Never Smoker     Smokeless tobacco: Never Used   Substance Use Topics     Alcohol use: No     Drug use: No       Social History     Social History Narrative     Not on file       Allergies   Allergen Reactions     Horse Allergy Itching     Cos Cob Butter Hives     Nkda [No Known Drug Allergies]               Review of Systems:     General: No fever or recent illness  ENT: No upper respiratory symptoms.  Covid screening questions are negative  Respiratory: No cough or shortness of breath  Cardiovascular: No chest pain  GI: No blood in stool or black stool  Neurologic: No weakness, paresthesias           Physical Exam:     Vitals:    01/31/22 0810   BP: 124/86   Pulse: 68   Resp: 20   Temp: (!) 95.5  F (35.3  C)   TempSrc: Oral   SpO2: 99%   Weight: 77.1 kg (170 lb)   Height: 1.601 m (5' 3.03\")     Body mass index is 30.08 kg/m .    General: Alert, in no acute distress  HEENT: Head is free of trauma.   Sclerae non-icteric. PERRL, Moist oral mucus membranes, no tonsilar hypertrophy or exudate.   Neck: No midline cervical tenderness.  No tenderness along the sternocleidomastoid.  Able to flex, extend, and turn his head to side without discomfort.  Subtle reduced range of motion to head turning in either direction.  Resp: Clear to auscultation bilaterally  CV: Regular rate and rhythm  Ext: Normal muscle bulk and tone of the upper extremities.  No tenderness along the clavicle, AC, deltoid, proximal bicep.  5 out of 5 strength in both upper extremities  Skin: exposed skin free of rash  Psych: Mood appropriate       Assessment and Plan   1. Strain of neck muscle, subsequent encounter  Symptoms have resolved after a 6-day Medrol Dosepak, and 6 " sessions of acupuncture  Discussed the natural history of cervical radiculopathy with his MRI findings, and my recommendation to pursue physical therapy and education   We reviewed some simple home exercises, which she will try and consider physical therapy in the future    2. Special screening for malignant neoplasms, colon  We discussed the risks and benefits of colon cancer screening with colonoscopy  He consented to colonoscopy screening  Referral was placed    - Adult Gastro Ref - Procedure Only; Future    3. Cervical radiculopathy  As outlined above      Follow up: As needed  Options for treatment and follow-up care were reviewed with the patient and/or guardian. Jose Abreu and/or guardian engaged in the decision making process and verbalized understanding of the options discussed and agreed with the final plan.    Bob Díaz MD  Faculty - [facility]

## 2022-05-24 ENCOUNTER — OFFICE VISIT (OUTPATIENT)
Dept: FAMILY MEDICINE | Facility: CLINIC | Age: 47
End: 2022-05-24
Payer: COMMERCIAL

## 2022-05-24 VITALS
HEIGHT: 62 IN | DIASTOLIC BLOOD PRESSURE: 74 MMHG | OXYGEN SATURATION: 98 % | HEART RATE: 80 BPM | BODY MASS INDEX: 31.1 KG/M2 | WEIGHT: 169 LBS | SYSTOLIC BLOOD PRESSURE: 112 MMHG

## 2022-05-24 DIAGNOSIS — G56.03 BILATERAL CARPAL TUNNEL SYNDROME: Primary | ICD-10-CM

## 2022-05-24 DIAGNOSIS — Z11.4 SCREENING FOR HIV (HUMAN IMMUNODEFICIENCY VIRUS): ICD-10-CM

## 2022-05-24 DIAGNOSIS — Z11.59 NEED FOR HEPATITIS C SCREENING TEST: ICD-10-CM

## 2022-05-24 DIAGNOSIS — J30.81 ALLERGIC RHINITIS DUE TO ANIMALS: ICD-10-CM

## 2022-05-24 DIAGNOSIS — R20.0 BILATERAL HAND NUMBNESS: ICD-10-CM

## 2022-05-24 DIAGNOSIS — Z12.11 SCREEN FOR COLON CANCER: ICD-10-CM

## 2022-05-24 PROCEDURE — 96372 THER/PROPH/DIAG INJ SC/IM: CPT | Performed by: FAMILY MEDICINE

## 2022-05-24 PROCEDURE — 99214 OFFICE O/P EST MOD 30 MIN: CPT | Mod: 25 | Performed by: FAMILY MEDICINE

## 2022-05-24 RX ORDER — METHYLPREDNISOLONE ACETATE 80 MG/ML
80 INJECTION, SUSPENSION INTRA-ARTICULAR; INTRALESIONAL; INTRAMUSCULAR; SOFT TISSUE ONCE
Status: COMPLETED | OUTPATIENT
Start: 2022-05-24 | End: 2022-05-24

## 2022-05-24 RX ORDER — LEVOCETIRIZINE DIHYDROCHLORIDE 5 MG/1
5 TABLET, FILM COATED ORAL 2 TIMES DAILY PRN
Qty: 60 TABLET | Refills: 3 | Status: SHIPPED | OUTPATIENT
Start: 2022-05-24

## 2022-05-24 RX ADMIN — METHYLPREDNISOLONE ACETATE 80 MG: 80 INJECTION, SUSPENSION INTRA-ARTICULAR; INTRALESIONAL; INTRAMUSCULAR; SOFT TISSUE at 17:33

## 2022-05-24 ASSESSMENT — ASTHMA QUESTIONNAIRES
QUESTION_2 LAST FOUR WEEKS HOW OFTEN HAVE YOU HAD SHORTNESS OF BREATH: NOT AT ALL
QUESTION_1 LAST FOUR WEEKS HOW MUCH OF THE TIME DID YOUR ASTHMA KEEP YOU FROM GETTING AS MUCH DONE AT WORK, SCHOOL OR AT HOME: NONE OF THE TIME
QUESTION_3 LAST FOUR WEEKS HOW OFTEN DID YOUR ASTHMA SYMPTOMS (WHEEZING, COUGHING, SHORTNESS OF BREATH, CHEST TIGHTNESS OR PAIN) WAKE YOU UP AT NIGHT OR EARLIER THAN USUAL IN THE MORNING: NOT AT ALL
QUESTION_5 LAST FOUR WEEKS HOW WOULD YOU RATE YOUR ASTHMA CONTROL: COMPLETELY CONTROLLED
ACT_TOTALSCORE: 25
ACT_TOTALSCORE: 25
QUESTION_4 LAST FOUR WEEKS HOW OFTEN HAVE YOU USED YOUR RESCUE INHALER OR NEBULIZER MEDICATION (SUCH AS ALBUTEROL): NOT AT ALL

## 2022-05-24 NOTE — PATIENT INSTRUCTIONS
Thanks for coming in today Jose    I think you are experiencing bilateral carpal tunnel syndrome    I written out durable medical equipment prescriptions for our [facility] medical that can be picked up at the specialty building near [facility]    Wear these nightly    You are doing a Depo-Medrol 80 mg shot today for your allergies    Also start Xyzal 5 mg up to twice daily for allergy symptoms    See Dr. Tao Goldman at [facility] for evaluation and treatment of carpal tunnel you likely will offer you corticosteroid injections which I think you should do    Suha

## 2022-05-24 NOTE — ASSESSMENT & PLAN NOTE
"Bilateral     R = L     1 + year as long as 10 years  Work?  Might have to do with work  No injury    Daily    Numb Whole hand goes to upper arm     \"cut finger nail \"can't feel\"    Has to shake to wake    Last week \"woke up numb\"        "

## 2022-05-24 NOTE — PROGRESS NOTES
"Patient Instructions   Thanks for coming in today Jose    I think you are experiencing bilateral carpal tunnel syndrome    I written out durable medical equipment prescriptions for our [facility] medical that can be picked up at the specialty building near [facility]    Wear these nightly    You are doing a Depo-Medrol 80 mg shot today for your allergies    Also start Xyzal 5 mg up to twice daily for allergy symptoms    See Dr. Tao Goldman at [facility] for evaluation and treatment of carpal tunnel you likely will offer you corticosteroid injections which I think you should do    Suha smith following    ASSESSMENT & PLAN    Bilateral carpal tunnel syndrome  Bilateral     R = L     1 + year as long as 10 years  Work?  Might have to do with work  No injury    Daily    Numb Whole hand goes to upper arm     \"cut finger nail \"can't feel\"    Has to shake to wake    Last week \"woke up numb\"            Jose was seen today for hand problem.    Diagnoses and all orders for this visit:    Bilateral carpal tunnel syndrome  -     Orthopedic  Referral; Future  -     Wrist/Arm/Hand Supplies Order for DME - ONLY FOR DME    Screen for colon cancer  -     Adult Gastro Ref - Procedure Only; Future    Screening for HIV (human immunodeficiency virus)    Need for hepatitis C screening test    Allergic rhinitis due to animals  -     methylPREDNISolone (DEPO-MEDROL) injection 80 mg  -     levocetirizine (XYZAL) 5 MG tablet; Take 1 tablet (5 mg) by mouth 2 times daily as needed for allergies    Bilateral hand numbness  -     Wrist/Arm/Hand Supplies Order for DME - ONLY FOR DME    Other orders  -     REVIEW OF HEALTH MAINTENANCE PROTOCOL ORDERS        There are no Patient Instructions on file for this visit.    Return in about 1 month (around 6/24/2022) for if symptoms persist or worsen despite plan of care.       PATIENT HEALTH QUESTIONNAIRE-9 (PHQ - 9)    Over the last 2 weeks, how often have " you been bothered by any of the following problems?    1. Little interest or pleasure in doing things -      2. Feeling down, depressed, or hopeless -      3. Trouble falling or staying asleep, or sleeping too much -     4. Feeling tired or having little energy -      5. Poor appetite or overeating -      6. Feeling bad about yourself - or that you are a failure or have let yourself or your family down -      7. Trouble concentrating on things, such as reading the newspaper or watching television -     8. Moving or speaking so slowly that other people could have noticed? Or the opposite - being so fidgety or restless that you have been moving around a lot more than usual     9. Thoughts that you would be better off dead or of hurting  yourself in some way     Total Score:       If you checked off any problems, how difficult have these problems made it for you to do your work, take care of things at home, or get along with other people?      Developed by Katie Carbone, Kiera Barth, Reji Webb and colleagues, with an educational gris from [employer] No permission required to reproduce, translate, display or distribute. permission required to reproduce, translate, display or distribute.    CHIEF COMPLAINT: Jose Abreu had concerns including Hand Problem (Tingling and numbness on and off ).    [place]: 1.............. SUBJECTIVE:  Jose Abreu is a 46 year old male had concerns including Hand Problem (Tingling and numbness on and off ).    1. Bilateral carpal tunnel syndrome    2. Screen for colon cancer    3. Screening for HIV (human immunodeficiency virus)    4. Need for hepatitis C screening test    5. Allergic rhinitis due to animals    6. Bilateral hand numbness          Allergies   Allergen Reactions     Horse Allergy Itching     [place] Butter Hives     Nkda [No Known Drug Allergies]                          SOCIAL: He  reports that he has never smoked. He has never used smokeless tobacco. He  "reports that he does not drink alcohol and does not use drugs.    REVIEW OF SYSTEMS:   Family history not pertinent to chief complaint or presenting problem    Review of systems otherwise negative as requested from patient, except   Those positive ROS outlined and discussed in [place].      VITALS:  Vitals:    05/24/22 1617   BP: 112/74   BP Location: Left arm   Patient Position: Sitting   Cuff Size: Adult Large   Pulse: 80   SpO2: 98%   Weight: 76.7 kg (169 lb)   Height: 1.575 m (5' 2\")     Wt Readings from Last 3 Encounters:   05/24/22 76.7 kg (169 lb)   01/31/22 77.1 kg (170 lb)   01/17/22 79.4 kg (175 lb)     Body mass index is 30.91 kg/m .    Physical Exam:  Positive Phalen sign  Positive Tinel's sign/squeeze test  Strength testing 5/5 intrinsic muscle hand strength  Flexion-extension the elbow abduction of the shoulder 5/5  Spurling's maneuver negative  No thenar or hypothenar wasting    Nasal mucosa boggy congested  Some chemosis of the conjunctiva   I spent 30 minutes with this patient.  This includes pre-visit, intra-visit and post visit work an evaluation with regards to Jose was seen today for hand problem.    Diagnoses and all orders for this visit:    Bilateral carpal tunnel syndrome  -     Orthopedic  Referral; Future  -     Wrist/Arm/Hand Supplies Order for DME - ONLY FOR DME    Screen for colon cancer  -     Adult Gastro Ref - Procedure Only; Future    Screening for HIV (human immunodeficiency virus)    Need for hepatitis C screening test    Allergic rhinitis due to animals  -     methylPREDNISolone (DEPO-MEDROL) injection 80 mg  -     levocetirizine (XYZAL) 5 MG tablet; Take 1 tablet (5 mg) by mouth 2 times daily as needed for allergies    Bilateral hand numbness  -     Wrist/Arm/Hand Supplies Order for DME - ONLY FOR DME    Other orders  -     REVIEW OF HEALTH MAINTENANCE PROTOCOL ORDERS        Kodak Pisano MD  Family Medicine   [facility] " [id number]  [phone]  Answers for HPI/ROS submitted by the patient on 5/24/2022  What is the reason for your visit today? : Hands pain  How many servings of fruits and vegetables do you eat daily?: 2-3  On average, how many sweetened beverages do you drink each day (Examples: soda, juice, sweet tea, etc.  Do NOT count diet or artificially sweetened beverages)?: 2  How many minutes a day do you exercise enough to make your heart beat faster?: 9 or less  How many days a week do you exercise enough to make your heart beat faster?: 3 or less  How many days per week do you miss taking your medication?: 0

## 2022-05-31 ENCOUNTER — TRANSFERRED RECORDS (OUTPATIENT)
Dept: HEALTH INFORMATION MANAGEMENT | Facility: CLINIC | Age: 47
End: 2022-05-31
Payer: COMMERCIAL

## 2022-06-22 DIAGNOSIS — J30.81 ALLERGIC RHINITIS DUE TO ANIMALS: ICD-10-CM

## 2022-06-23 RX ORDER — LEVOCETIRIZINE DIHYDROCHLORIDE 5 MG/1
5 TABLET, FILM COATED ORAL 2 TIMES DAILY PRN
Qty: 60 TABLET | Refills: 3 | OUTPATIENT
Start: 2022-06-23

## 2022-06-23 NOTE — TELEPHONE ENCOUNTER
Refused refill request as there should already be refills on file.   Filled 5/24/2022 disp 60 with 3 refills  Amber Dean RN   06/23/22 9:03 AM  [facility] Nurse Advisor

## 2022-07-11 ENCOUNTER — TELEPHONE (OUTPATIENT)
Dept: FAMILY MEDICINE | Facility: CLINIC | Age: 47
End: 2022-07-11

## 2022-07-11 NOTE — TELEPHONE ENCOUNTER
Reason for Call:  Other call back    Detailed comments: Pt asking as to where Dr Pisano will be going, he would like to follow    Phone Number Patient can be reached at: Home number on file [phone] (home)    Best Time: anytime    Can we leave a detailed message on this number? YES    Call taken on 7/11/2022 at 8:06 AM by Argenis Coombs

## 2022-08-03 ENCOUNTER — TRANSFERRED RECORDS (OUTPATIENT)
Dept: HEALTH INFORMATION MANAGEMENT | Facility: CLINIC | Age: 47
End: 2022-08-03

## 2022-08-11 ENCOUNTER — TRANSFERRED RECORDS (OUTPATIENT)
Dept: HEALTH INFORMATION MANAGEMENT | Facility: CLINIC | Age: 47
End: 2022-08-11

## 2022-08-17 ENCOUNTER — TRANSFERRED RECORDS (OUTPATIENT)
Dept: HEALTH INFORMATION MANAGEMENT | Facility: CLINIC | Age: 47
End: 2022-08-17

## 2022-09-04 ENCOUNTER — HEALTH MAINTENANCE LETTER (OUTPATIENT)
Age: 47
End: 2022-09-04

## 2022-09-08 ENCOUNTER — LAB REQUISITION (OUTPATIENT)
Dept: LAB | Facility: CLINIC | Age: 47
End: 2022-09-08

## 2022-09-08 DIAGNOSIS — Z13.220 ENCOUNTER FOR SCREENING FOR LIPOID DISORDERS: ICD-10-CM

## 2022-09-08 DIAGNOSIS — Z13.1 ENCOUNTER FOR SCREENING FOR DIABETES MELLITUS: ICD-10-CM

## 2022-09-08 LAB
ALBUMIN SERPL BCG-MCNC: 4.2 G/DL (ref 3.5–5.2)
ALP SERPL-CCNC: 77 U/L (ref 40–129)
ALT SERPL W P-5'-P-CCNC: 55 U/L (ref 10–50)
ANION GAP SERPL CALCULATED.3IONS-SCNC: 9 MMOL/L (ref 7–15)
AST SERPL W P-5'-P-CCNC: 32 U/L (ref 10–50)
BILIRUB SERPL-MCNC: 0.6 MG/DL
BUN SERPL-MCNC: 13.3 MG/DL (ref 6–20)
CALCIUM SERPL-MCNC: 9.3 MG/DL (ref 8.6–10)
CHLORIDE SERPL-SCNC: 103 MMOL/L (ref 98–107)
CHOLEST SERPL-MCNC: 214 MG/DL
CREAT SERPL-MCNC: 1.01 MG/DL (ref 0.67–1.17)
DEPRECATED HCO3 PLAS-SCNC: 29 MMOL/L (ref 22–29)
GFR SERPL CREATININE-BSD FRML MDRD: >90 ML/MIN/1.73M2
GLUCOSE SERPL-MCNC: 109 MG/DL (ref 70–99)
HDLC SERPL-MCNC: 53 MG/DL
LDLC SERPL CALC-MCNC: 131 MG/DL
NONHDLC SERPL-MCNC: 161 MG/DL
POTASSIUM SERPL-SCNC: 3.9 MMOL/L (ref 3.4–5.3)
PROT SERPL-MCNC: 6.7 G/DL (ref 6.4–8.3)
SODIUM SERPL-SCNC: 141 MMOL/L (ref 136–145)
TRIGL SERPL-MCNC: 148 MG/DL

## 2022-09-08 PROCEDURE — 80053 COMPREHEN METABOLIC PANEL: CPT | Performed by: FAMILY MEDICINE

## 2022-09-08 PROCEDURE — 80061 LIPID PANEL: CPT | Performed by: FAMILY MEDICINE

## 2022-09-15 ENCOUNTER — PATIENT OUTREACH (OUTPATIENT)
Dept: FAMILY MEDICINE | Facility: CLINIC | Age: 47
End: 2022-09-15

## 2022-09-15 NOTE — TELEPHONE ENCOUNTER
Reach out pt for the [facility] follow up.    Pt said that he is now going to a different clinic.    Isabela

## 2022-12-01 ENCOUNTER — TELEPHONE (OUTPATIENT)
Dept: FAMILY MEDICINE | Facility: CLINIC | Age: 47
End: 2022-12-01

## 2023-01-21 ENCOUNTER — HEALTH MAINTENANCE LETTER (OUTPATIENT)
Age: 48
End: 2023-01-21

## 2024-02-18 ENCOUNTER — HEALTH MAINTENANCE LETTER (OUTPATIENT)
Age: 49
End: 2024-02-18

## 2025-03-09 ENCOUNTER — HEALTH MAINTENANCE LETTER (OUTPATIENT)
Age: 50
End: 2025-03-09

## 2025-05-13 ENCOUNTER — HOSPITAL ENCOUNTER (EMERGENCY)
Facility: CLINIC | Age: 50
Discharge: HOME | End: 2025-05-14
Attending: STUDENT IN AN ORGANIZED HEALTH CARE EDUCATION/TRAINING PROGRAM | Admitting: STUDENT IN AN ORGANIZED HEALTH CARE EDUCATION/TRAINING PROGRAM
Payer: COMMERCIAL

## 2025-05-13 ENCOUNTER — APPOINTMENT (OUTPATIENT)
Dept: ULTRASOUND IMAGING | Facility: CLINIC | Age: 50
End: 2025-05-13
Attending: STUDENT IN AN ORGANIZED HEALTH CARE EDUCATION/TRAINING PROGRAM
Payer: COMMERCIAL

## 2025-05-13 VITALS
RESPIRATION RATE: 18 BRPM | HEART RATE: 72 BPM | TEMPERATURE: 97.5 F | OXYGEN SATURATION: 100 % | DIASTOLIC BLOOD PRESSURE: 76 MMHG | SYSTOLIC BLOOD PRESSURE: 128 MMHG

## 2025-05-13 DIAGNOSIS — D69.6 LOW PLATELET COUNT: ICD-10-CM

## 2025-05-13 DIAGNOSIS — I82.452 PERONEAL DVT (DEEP VENOUS THROMBOSIS), LEFT (H): ICD-10-CM

## 2025-05-13 LAB
ALBUMIN SERPL BCG-MCNC: 4.2 G/DL (ref 3.5–5.2)
ALP SERPL-CCNC: 91 U/L (ref 40–150)
ALT SERPL W P-5'-P-CCNC: 53 U/L (ref 0–70)
ANION GAP SERPL CALCULATED.3IONS-SCNC: 8 MMOL/L (ref 7–15)
AST SERPL W P-5'-P-CCNC: 38 U/L (ref 0–45)
BASOPHILS # BLD AUTO: 0.1 10E3/UL (ref 0–0.2)
BASOPHILS NFR BLD AUTO: 1 %
BILIRUB SERPL-MCNC: 0.4 MG/DL
BUN SERPL-MCNC: 16.7 MG/DL (ref 6–20)
CALCIUM SERPL-MCNC: 9.2 MG/DL (ref 8.8–10.4)
CHLORIDE SERPL-SCNC: 104 MMOL/L (ref 98–107)
CREAT SERPL-MCNC: 0.95 MG/DL (ref 0.67–1.17)
EGFRCR SERPLBLD CKD-EPI 2021: >90 ML/MIN/1.73M2
EOSINOPHIL # BLD AUTO: 0.1 10E3/UL (ref 0–0.7)
EOSINOPHIL NFR BLD AUTO: 1 %
ERYTHROCYTE [DISTWIDTH] IN BLOOD BY AUTOMATED COUNT: 12.7 % (ref 10–15)
GLUCOSE SERPL-MCNC: 106 MG/DL (ref 70–99)
HCO3 SERPL-SCNC: 24 MMOL/L (ref 22–29)
HCT VFR BLD AUTO: 45.6 % (ref 40–53)
HGB BLD-MCNC: 15.8 G/DL (ref 13.3–17.7)
IMM GRANULOCYTES # BLD: 0 10E3/UL
IMM GRANULOCYTES NFR BLD: 0 %
LYMPHOCYTES # BLD AUTO: 2.4 10E3/UL (ref 0.8–5.3)
LYMPHOCYTES NFR BLD AUTO: 31 %
MCH RBC QN AUTO: 30.9 PG (ref 26.5–33)
MCHC RBC AUTO-ENTMCNC: 34.6 G/DL (ref 31.5–36.5)
MCV RBC AUTO: 89 FL (ref 78–100)
MONOCYTES # BLD AUTO: 0.8 10E3/UL (ref 0–1.3)
MONOCYTES NFR BLD AUTO: 10 %
NEUTROPHILS # BLD AUTO: 4.4 10E3/UL (ref 1.6–8.3)
NEUTROPHILS NFR BLD AUTO: 56 %
NRBC # BLD AUTO: 0 10E3/UL
NRBC BLD AUTO-RTO: 0 /100
PLATELET # BLD AUTO: 124 10E3/UL (ref 150–450)
POTASSIUM SERPL-SCNC: 4.1 MMOL/L (ref 3.4–5.3)
PROT SERPL-MCNC: 7 G/DL (ref 6.4–8.3)
RBC # BLD AUTO: 5.11 10E6/UL (ref 4.4–5.9)
SODIUM SERPL-SCNC: 136 MMOL/L (ref 135–145)
WBC # BLD AUTO: 7.8 10E3/UL (ref 4–11)

## 2025-05-13 PROCEDURE — 99284 EMERGENCY DEPT VISIT MOD MDM: CPT | Mod: 25

## 2025-05-13 PROCEDURE — 80053 COMPREHEN METABOLIC PANEL: CPT | Performed by: STUDENT IN AN ORGANIZED HEALTH CARE EDUCATION/TRAINING PROGRAM

## 2025-05-13 PROCEDURE — 93971 EXTREMITY STUDY: CPT | Mod: LT

## 2025-05-13 PROCEDURE — 36415 COLL VENOUS BLD VENIPUNCTURE: CPT | Performed by: STUDENT IN AN ORGANIZED HEALTH CARE EDUCATION/TRAINING PROGRAM

## 2025-05-13 PROCEDURE — 85025 COMPLETE CBC W/AUTO DIFF WBC: CPT | Performed by: STUDENT IN AN ORGANIZED HEALTH CARE EDUCATION/TRAINING PROGRAM

## 2025-05-13 PROCEDURE — 250N000013 HC RX MED GY IP 250 OP 250 PS 637: Performed by: STUDENT IN AN ORGANIZED HEALTH CARE EDUCATION/TRAINING PROGRAM

## 2025-05-13 RX ORDER — ACETAMINOPHEN 500 MG
1000 TABLET ORAL ONCE
Status: COMPLETED | OUTPATIENT
Start: 2025-05-13 | End: 2025-05-13

## 2025-05-13 RX ADMIN — RIVAROXABAN 15 MG: 15 TABLET, FILM COATED ORAL at 23:49

## 2025-05-13 RX ADMIN — ACETAMINOPHEN 1000 MG: 500 TABLET, FILM COATED ORAL at 22:50

## 2025-05-13 ASSESSMENT — COLUMBIA-SUICIDE SEVERITY RATING SCALE - C-SSRS
2. HAVE YOU ACTUALLY HAD ANY THOUGHTS OF KILLING YOURSELF IN THE PAST MONTH?: NO
6. HAVE YOU EVER DONE ANYTHING, STARTED TO DO ANYTHING, OR PREPARED TO DO ANYTHING TO END YOUR LIFE?: NO
1. IN THE PAST MONTH, HAVE YOU WISHED YOU WERE DEAD OR WISHED YOU COULD GO TO SLEEP AND NOT WAKE UP?: NO

## 2025-05-13 ASSESSMENT — ACTIVITIES OF DAILY LIVING (ADL)
ADLS_ACUITY_SCORE: 41

## 2025-05-13 NOTE — ED TRIAGE NOTES
Arrives fully ambulatory from the community. States his entire left calf has been painful for aprox 2 week, also reports subjective swelling. Denies redness or hot to the touch.       Denies tylenol or motrin prior to arrival. Denies trauma to the area.     In triage leg appear none concerning.

## 2025-05-14 NOTE — DISCHARGE INSTRUCTIONS
Take blood thinner as prescribed.  15 mg by mouth twice daily for 21 days, followed by 20 mg once daily with food follow-up with your primary care provider for recheck.  They will determine if you need to follow-up with the vein clinic or hematology/oncology for ongoing evaluation and management  You can't take ibuprofen while on this medication unfortunately  Return to the ED for chest pain or shortness of breath, spreading redness or pain of your leg, numbness, or further emergent concerns

## 2025-05-14 NOTE — ED PROVIDER NOTES
Emergency Department Note      History of Present Illness     Chief Complaint   Leg Pain    HPI   Jose Abreu is a 49 year old male presenting to the ED with leg pain. The patient reports persisting left calf pain and subjective swelling onset 2 weeks ago while at his uncle's . Upon evaluation, Jose states his pain is radiating up and down his leg; standing on his feet or dangling his legs down seems to aggravate symptoms. Patient denies falls, trauma or knee pain. No long travel, flights, medication changes, hormonal medication or Recent surgery. No family hisotry of DVT/PE.    He denies chest pain or shortness of breath    Independent Historian   None    Review of External Notes   Urgent care note from earlier today when seen for left calf pain    Past Medical History     Medical History and Problem List   ACL  Hepatitis B  Bilateral carpal tunnel     Medications   levocetirizine     Surgical History   Herniorrhaphy   Bilateral carpal tunnel release     Physical Exam     Patient Vitals for the past 24 hrs:   BP Temp Temp src Pulse Resp SpO2   25 1839 (!) 137/99 97.5  F (36.4  C) Temporal 80 18 99 %     Physical Exam  Vital signs and nursing notes reviewed.    General:  Alert and oriented, no acute distress. Resting on bed with partner at bedside.   Skin: Skin is warm and dry. No rash. No diaphoresis.  HEENT:   Head: Normocephalic, atraumatic. Facial features symmetric.   Eyes: Conjunctiva pink, sclera white. EOMs grossly intact.   Mouth and throat: Lips are moist with no lesions or edema  Neck: Normal range of motion.   CV:  Heart RRR. 2+ radial and dorsalis pedal pulses bilaterally.   Pulm/Chest: Chest wall expansion symmetric with no increased effort of breathing. Lungs clear and equal to auscultation bilaterally.   M/S: Moves all extremities spontaneously.  Left lower extremity: Tender to palpation of calf without obvious swelling or erythema.  2-point sensation intact to distal extremity, no  bony tenderness of knee, tib-fib, ankle, or foot  Psych: Normal mood and affect. Behavior is normal.      Diagnostics     Lab Results   Labs Ordered and Resulted from Time of ED Arrival to Time of ED Departure   CBC WITH PLATELETS AND DIFFERENTIAL - Abnormal       Result Value    WBC Count 7.8      RBC Count 5.11      Hemoglobin 15.8      Hematocrit 45.6      MCV 89      MCH 30.9      MCHC 34.6      RDW 12.7      Platelet Count 124 (*)     % Neutrophils 56      % Lymphocytes 31      % Monocytes 10      % Eosinophils 1      % Basophils 1      % Immature Granulocytes 0      NRBCs per 100 WBC 0      Absolute Neutrophils 4.4      Absolute Lymphocytes 2.4      Absolute Monocytes 0.8      Absolute Eosinophils 0.1      Absolute Basophils 0.1      Absolute Immature Granulocytes 0.0      Absolute NRBCs 0.0     COMPREHENSIVE METABOLIC PANEL - Abnormal    Sodium 136      Potassium 4.1      Carbon Dioxide (CO2) 24      Anion Gap 8      Urea Nitrogen 16.7      Creatinine 0.95      GFR Estimate >90      Calcium 9.2      Chloride 104      Glucose 106 (*)     Alkaline Phosphatase 91      AST 38      ALT 53      Protein Total 7.0      Albumin 4.2      Bilirubin Total 0.4         Imaging   US Lower Extremity Venous Duplex Left   Final Result   IMPRESSION:   1.  Left leg: Peroneal vein DVT. Findings conveyed by telephone to DANN Bowen, at 2200 hours.        Independent Interpretation   None    ED Course      Medications Administered   Medications - No data to display    Procedures   Procedures     Discussion of Management/ED Course   ED Course as of 05/14/25 0000   Tue May 13, 2025   1945 I obtained the history and examined the patient as noted above.     2158 I spoke with Dr. Rodas, [facility], regarding patient's presentation, findings, and plan of care.  Perineal calf DVT      2201 I reassessed the patient and updated them on results and plan of care.      2300 I reassessed the patient and updated them on  results and plan of care.    reviewed mild thrombocytopenia and bleeding risk.  Will  move forward with anticoagulation       Additional Documentation  None    Medical Decision Making / Diagnosis     CMS Diagnoses: None    MIPS   None           MDM   Jose Abreu is a 49 year old male who presents to the ED for evaluation of calf pain.  See HPI.  Vitals reassuring.  On exam, he has mild tenderness to palpation of the left calf without significant overlying warmth, erythema, or edema.  CMS is intact.  Ultrasound reveals a peroneal DVT.  No indication for thrombectomy after speaking to IR who read imaging.  He has no signs or symptoms to suggest pulmonary embolism.  He has no previous history of blood clots or notable provoking factors.  We discussed anticoagulation and bleeding risk.  Labs were obtained.  He does have a mild thrombocytopenia, but not low enough to adjust dosing.  Through shared decision making, and risk-benefit discussion, will initiate anticoagulation.  First dose of Xarelto provided here and coupon given for home.  He needs close follow-up with his primary care provider to undergo coagulopathy workup with either the vein clinic or hematology oncology.  Using reasonable clinical judgment, the patient is safe for discharge home.  He will return to the ED for chest pain or shortness of breath, spreading redness up his leg, numbness, or further emergent concerns.  Patient and his partner in the room are agreeable to plan and had questions answered    Disposition   The patient was discharged.     Diagnosis     ICD-10-CM    1. Peroneal DVT (deep venous thrombosis), left (H)  I82.452       2. Low platelet count  D69.6     mild           Discharge Medications   Discharge Medication List as of 5/13/2025 11:56 PM        START taking these medications    Details   Rivaroxaban ANTICOAGULANT 15 & 20 MG TBPK Starter Therapy Pack Take 15 mg by mouth 2 times daily (with meals) for 21 days, THEN 20 mg daily with  food for 9 days. Continue as directed by provider., Disp-51 each, R-0, E-Prescribe             Scribe Disclosure:  I, Karina Pablo, am serving as a scribe at 7:10 PM on 5/13/2025 to document services personally performed by Sharmaine Soto PA-C based on my observations and the provider's statements to me.     Sharmaine Soto PA-C on 5/14/2025 at 12:03 AM       Sharmaine Soto PA-C  05/14/25 0003       Sharmaine Soto PA-C  05/14/25 0003